# Patient Record
Sex: MALE | Race: WHITE
[De-identification: names, ages, dates, MRNs, and addresses within clinical notes are randomized per-mention and may not be internally consistent; named-entity substitution may affect disease eponyms.]

---

## 2018-06-07 ENCOUNTER — HOSPITAL ENCOUNTER (OUTPATIENT)
Dept: HOSPITAL 76 - LAB.N | Age: 23
End: 2018-06-07
Attending: FAMILY MEDICINE
Payer: MEDICAID

## 2018-06-07 DIAGNOSIS — Z00.00: Primary | ICD-10-CM

## 2018-06-07 DIAGNOSIS — E66.9: ICD-10-CM

## 2018-06-07 LAB
ALBUMIN DIAFP-MCNC: 4 G/DL (ref 3.2–5.5)
ALBUMIN/GLOB SERPL: 1.2 {RATIO} (ref 1–2.2)
ALP SERPL-CCNC: 47 IU/L (ref 42–121)
ALT SERPL W P-5'-P-CCNC: 38 IU/L (ref 10–60)
ANION GAP SERPL CALCULATED.4IONS-SCNC: 7 MMOL/L (ref 6–13)
AST SERPL W P-5'-P-CCNC: 24 IU/L (ref 10–42)
BASOPHILS NFR BLD AUTO: 0.1 10^3/UL (ref 0–0.1)
BASOPHILS NFR BLD AUTO: 0.6 %
BILIRUB BLD-MCNC: 0.5 MG/DL (ref 0.2–1)
BUN SERPL-MCNC: 18 MG/DL (ref 6–20)
CALCIUM UR-MCNC: 9.1 MG/DL (ref 8.5–10.3)
CHLORIDE SERPL-SCNC: 105 MMOL/L (ref 101–111)
CHOLEST SERPL-MCNC: 144 MG/DL
CO2 SERPL-SCNC: 25 MMOL/L (ref 21–32)
CREAT SERPLBLD-SCNC: 0.9 MG/DL (ref 0.6–1.2)
EOSINOPHIL # BLD AUTO: 0.2 10^3/UL (ref 0–0.7)
EOSINOPHIL NFR BLD AUTO: 1.3 %
ERYTHROCYTE [DISTWIDTH] IN BLOOD BY AUTOMATED COUNT: 13.5 % (ref 12–15)
GFRSERPLBLD MDRD-ARVRAT: 104 ML/MIN/{1.73_M2} (ref 89–?)
GLOBULIN SER-MCNC: 3.3 G/DL (ref 2.1–4.2)
GLUCOSE SERPL-MCNC: 94 MG/DL (ref 70–100)
HDLC SERPL-MCNC: 40 MG/DL
HDLC SERPL: 3.6 {RATIO} (ref ?–5)
HGB UR QL STRIP: 14.5 G/DL (ref 14–18)
LDLC SERPL CALC-MCNC: 85 MG/DL
LDLC/HDLC SERPL: 2.1 {RATIO} (ref ?–3.6)
LYMPHOCYTES # SPEC AUTO: 3.6 10^3/UL (ref 1.5–3.5)
LYMPHOCYTES NFR BLD AUTO: 29.2 %
MCH RBC QN AUTO: 28.9 PG (ref 27–31)
MCHC RBC AUTO-ENTMCNC: 33.1 G/DL (ref 32–36)
MCV RBC AUTO: 87.3 FL (ref 80–94)
MONOCYTES # BLD AUTO: 1 10^3/UL (ref 0–1)
MONOCYTES NFR BLD AUTO: 8 %
NEUTROPHILS # BLD AUTO: 7.5 10^3/UL (ref 1.5–6.6)
NEUTROPHILS # SNV AUTO: 12.3 X10^3/UL (ref 4.8–10.8)
NEUTROPHILS NFR BLD AUTO: 60.9 %
PDW BLD AUTO: 8.5 FL (ref 7.4–11.4)
PLATELET # BLD: 329 10^3/UL (ref 130–450)
PROT SPEC-MCNC: 7.3 G/DL (ref 6.7–8.2)
RBC MAR: 5.01 10^6/UL (ref 4.7–6.1)
SODIUM SERPLBLD-SCNC: 137 MMOL/L (ref 135–145)
VLDLC SERPL-SCNC: 19 MG/DL

## 2018-06-07 PROCEDURE — 85025 COMPLETE CBC W/AUTO DIFF WBC: CPT

## 2018-06-07 PROCEDURE — 80053 COMPREHEN METABOLIC PANEL: CPT

## 2018-06-07 PROCEDURE — 80061 LIPID PANEL: CPT

## 2018-06-07 PROCEDURE — 84443 ASSAY THYROID STIM HORMONE: CPT

## 2018-06-07 PROCEDURE — 36415 COLL VENOUS BLD VENIPUNCTURE: CPT

## 2018-06-07 PROCEDURE — 83721 ASSAY OF BLOOD LIPOPROTEIN: CPT

## 2018-09-04 ENCOUNTER — HOSPITAL ENCOUNTER (EMERGENCY)
Dept: HOSPITAL 76 - ED | Age: 23
Discharge: HOME | End: 2018-09-04
Payer: COMMERCIAL

## 2018-09-04 VITALS — SYSTOLIC BLOOD PRESSURE: 151 MMHG | DIASTOLIC BLOOD PRESSURE: 90 MMHG

## 2018-09-04 DIAGNOSIS — R10.11: ICD-10-CM

## 2018-09-04 DIAGNOSIS — M94.0: Primary | ICD-10-CM

## 2018-09-04 LAB
ALBUMIN DIAFP-MCNC: 4.4 G/DL (ref 3.2–5.5)
ALBUMIN/GLOB SERPL: 1.4 {RATIO} (ref 1–2.2)
ALP SERPL-CCNC: 49 IU/L (ref 42–121)
ALT SERPL W P-5'-P-CCNC: 43 IU/L (ref 10–60)
ANION GAP SERPL CALCULATED.4IONS-SCNC: 8 MMOL/L (ref 6–13)
AST SERPL W P-5'-P-CCNC: 23 IU/L (ref 10–42)
BASOPHILS NFR BLD AUTO: 0.1 10^3/UL (ref 0–0.1)
BASOPHILS NFR BLD AUTO: 1 %
BILIRUB BLD-MCNC: 0.6 MG/DL (ref 0.2–1)
BUN SERPL-MCNC: 12 MG/DL (ref 6–20)
CALCIUM UR-MCNC: 9.1 MG/DL (ref 8.5–10.3)
CHLORIDE SERPL-SCNC: 104 MMOL/L (ref 101–111)
CLARITY UR REFRACT.AUTO: CLEAR
CO2 SERPL-SCNC: 27 MMOL/L (ref 21–32)
CREAT SERPLBLD-SCNC: 1 MG/DL (ref 0.6–1.2)
EOSINOPHIL # BLD AUTO: 0.1 10^3/UL (ref 0–0.7)
EOSINOPHIL NFR BLD AUTO: 1.1 %
ERYTHROCYTE [DISTWIDTH] IN BLOOD BY AUTOMATED COUNT: 13.5 % (ref 12–15)
GFRSERPLBLD MDRD-ARVRAT: 91 ML/MIN/{1.73_M2} (ref 89–?)
GLOBULIN SER-MCNC: 3.1 G/DL (ref 2.1–4.2)
GLUCOSE SERPL-MCNC: 101 MG/DL (ref 70–100)
GLUCOSE UR QL STRIP.AUTO: NEGATIVE MG/DL
HGB UR QL STRIP: 15.2 G/DL (ref 14–18)
KETONES UR QL STRIP.AUTO: NEGATIVE MG/DL
LIPASE SERPL-CCNC: 24 U/L (ref 22–51)
LYMPHOCYTES # SPEC AUTO: 3 10^3/UL (ref 1.5–3.5)
LYMPHOCYTES NFR BLD AUTO: 27.4 %
MCH RBC QN AUTO: 29 PG (ref 27–31)
MCHC RBC AUTO-ENTMCNC: 34 G/DL (ref 32–36)
MCV RBC AUTO: 85.2 FL (ref 80–94)
MONOCYTES # BLD AUTO: 0.7 10^3/UL (ref 0–1)
MONOCYTES NFR BLD AUTO: 6.6 %
NEUTROPHILS # BLD AUTO: 7 10^3/UL (ref 1.5–6.6)
NEUTROPHILS # SNV AUTO: 10.9 X10^3/UL (ref 4.8–10.8)
NEUTROPHILS NFR BLD AUTO: 63.9 %
NITRITE UR QL STRIP.AUTO: NEGATIVE
PDW BLD AUTO: 8.1 FL (ref 7.4–11.4)
PH UR STRIP.AUTO: 6.5 PH (ref 5–7.5)
PLATELET # BLD: 342 10^3/UL (ref 130–450)
PROT SPEC-MCNC: 7.5 G/DL (ref 6.7–8.2)
PROT UR STRIP.AUTO-MCNC: NEGATIVE MG/DL
RBC # UR STRIP.AUTO: NEGATIVE /UL
RBC MAR: 5.24 10^6/UL (ref 4.7–6.1)
SODIUM SERPLBLD-SCNC: 139 MMOL/L (ref 135–145)
SP GR UR STRIP.AUTO: 1.02 (ref 1–1.03)
UROBILINOGEN UR QL STRIP.AUTO: (no result) E.U./DL
UROBILINOGEN UR STRIP.AUTO-MCNC: NEGATIVE MG/DL

## 2018-09-04 PROCEDURE — 80053 COMPREHEN METABOLIC PANEL: CPT

## 2018-09-04 PROCEDURE — 99283 EMERGENCY DEPT VISIT LOW MDM: CPT

## 2018-09-04 PROCEDURE — 36415 COLL VENOUS BLD VENIPUNCTURE: CPT

## 2018-09-04 PROCEDURE — 76705 ECHO EXAM OF ABDOMEN: CPT

## 2018-09-04 PROCEDURE — 83690 ASSAY OF LIPASE: CPT

## 2018-09-04 PROCEDURE — 81003 URINALYSIS AUTO W/O SCOPE: CPT

## 2018-09-04 PROCEDURE — 85025 COMPLETE CBC W/AUTO DIFF WBC: CPT

## 2018-09-04 PROCEDURE — 81001 URINALYSIS AUTO W/SCOPE: CPT

## 2018-09-04 PROCEDURE — 87086 URINE CULTURE/COLONY COUNT: CPT

## 2018-09-04 NOTE — ED PHYSICIAN DOCUMENTATION
PD HPI ABD PAIN





- Stated complaint


Stated Complaint: SIDE PX





- Chief complaint


Chief Complaint: Abd Pain





- History obtained from


History obtained from: Patient





- History of Present Illness


Timing - onset: How many days ago (3)


Timing - duration: Days (3)


Timing - details: Gradual onset, Still present, Waxing and waning


Quality: Cramping, Aching, Pain


Location: RUQ, Epigastric


Improved by: No: Eating


Worsened by: Moving, Position (worse lying down and getting up (the movement of 

it)), Palpation.  No: Eating


Associated symptoms: No: Fever, Nausea, Vomiting, Diarrhea, Melena, Chest pain, 

Loss of appetite


Similar symptoms before: Has not had sx before


Recently seen: Not recently seen





Review of Systems


Constitutional: denies: Fever, Chills, Myalgias


Nose: denies: Rhinorrhea / runny nose, Congestion


Throat: denies: Sore throat


Cardiac: denies: Palpitations


Respiratory: denies: Dyspnea, Cough, Wheezing


GI: reports: Abdominal Pain (ruq only).  denies: Nausea, Vomiting, Diarrhea


Skin: denies: Rash, Lesions


Musculoskeletal: denies: Extremity swelling


Neurologic: denies: Generalized weakness, Focal weakness, Numbness, Near syncope





PD PAST MEDICAL HISTORY





- Past Medical History


Cardiovascular: None


Respiratory: None


Neuro: None


Endocrine/Autoimmune: None





- Past Surgical History


Past Surgical History: No





- Present Medications


Home Medications: 


 Ambulatory Orders











 Medication  Instructions  Recorded  Confirmed


 


Benzonatate [Tessalon Perle] 100 - 200 mg PO BID PRN #20 capsule 09/12/15 09/13/

15


 


Azithromycin [Zithromax] 250 mg PO DAILY 09/13/15 09/13/15


 


Promethazine HCl 25 mg PO Q6H PRN #30 tablet 09/13/15 


 


raNITIdine [Zantac] 150 mg PO DAILY #30 tablet 09/13/15 


 


Dexamethasone [Decadron] 4 mg PO DAILY #5 tablet 09/04/18 


 


Naproxen 375 mg PO BID #20 tablet 09/04/18 


 


Ondansetron HCl [Zofran] 4 mg PO Q6H PRN #20 tablet 09/04/18 


 


Tramadol HCl 50 mg PO Q6H PRN #20 tablet 09/04/18 














- Allergies


Allergies/Adverse Reactions: 


 Allergies











Allergy/AdvReac Type Severity Reaction Status Date / Time


 


No Known Drug Allergies Allergy   Verified 09/13/15 22:00














- Social History


Does the pt smoke?: No


Smoking Status: Never smoker


Does the pt drink ETOH?: No


Does the pt have substance abuse?: No





- Family History


Family history: denies: CAD





- Immunizations


Immunizations are current?: Yes





- POLST


Patient has POLST: No





PD ED PE NORMAL





- Vitals


Vital signs reviewed: Yes





- General


General: Alert and oriented X 3, No acute distress, Well developed/nourished





- HEENT


HEENT: Pharynx benign





- Neck


Neck: Supple, no meningeal sign, No adenopathy





- Cardiac


Cardiac: RRR, No murmur





- Respiratory


Respiratory: Clear bilaterally





- Abdomen


Abdomen: Normal bowel sounds, Soft, Non distended, No organomegaly, Other (

tender RUQ without guarding. Tender at lower costal margin without redness nor 

rash. Palpation of cartilage does reproduce his complaint pain. )





- Back


Back: No CVA TTP





- Derm


Derm: Normal color, Warm and dry, No rash





- Extremities


Extremities: No tenderness to palpate, Normal ROM s pain





- Neuro


Neuro: Alert and oriented X 3, No motor deficit, Normal speech





Results





- Vitals


Vitals: 


 Oxygen











O2 Source                      Room air

















- Labs


Labs: 


 Laboratory Tests











  09/04/18 09/04/18 09/04/18





  13:42 13:42 16:09


 


WBC  10.9 H  


 


RBC  5.24  


 


Hgb  15.2  


 


Hct  44.7  


 


MCV  85.2  


 


MCH  29.0  


 


MCHC  34.0  


 


RDW  13.5  


 


Plt Count  342  


 


MPV  8.1  


 


Neut # (Auto)  7.0 H  


 


Lymph # (Auto)  3.0  


 


Mono # (Auto)  0.7  


 


Eos # (Auto)  0.1  


 


Baso # (Auto)  0.1  


 


Absolute Nucleated RBC  0.00  


 


Nucleated RBC %  0.0  


 


Sodium   139 


 


Potassium   3.8 


 


Chloride   104 


 


Carbon Dioxide   27 


 


Anion Gap   8.0 


 


BUN   12 


 


Creatinine   1.0 


 


Estimated GFR (MDRD)   91 


 


Glucose   101 H 


 


Calcium   9.1 


 


Total Bilirubin   0.6 


 


AST   23 


 


ALT   43 


 


Alkaline Phosphatase   49 


 


Total Protein   7.5 


 


Albumin   4.4 


 


Globulin   3.1 


 


Albumin/Globulin Ratio   1.4 


 


Lipase   24 


 


Urine Color    YELLOW


 


Urine Clarity    CLEAR


 


Urine pH    6.5


 


Ur Specific Gravity    1.020


 


Urine Protein    NEGATIVE


 


Urine Glucose (UA)    NEGATIVE


 


Urine Ketones    NEGATIVE


 


Urine Occult Blood    NEGATIVE


 


Urine Nitrite    NEGATIVE


 


Urine Bilirubin    NEGATIVE


 


Urine Urobilinogen    0.2 (NORMAL)


 


Ur Leukocyte Esterase    NEGATIVE


 


Ur Microscopic Review    NOT INDICATED


 


Urine Culture Comments    NOT INDICATED














- Rads (name of study)


  ** RUQ U/S


Radiology: Prelim report reviewed (normal)





PD MEDICAL DECISION MAKING





- ED course


Complexity details: considered differential, d/w patient





- Sepsis Event


Vital Signs: 


 Oxygen











O2 Source                      Room air

















Departure





- Departure


Disposition: 01 Home, Self Care


Clinical Impression: 


 Acute costochondritis





Abdominal pain


Qualifiers:


 Abdominal location: right upper quadrant Qualified Code(s): R10.11 - Right 

upper quadrant pain





Condition: Stable


Record reviewed to determine appropriate education?: Yes


Instructions:  ED Chest Pain Costochondritis


Follow-Up: 


Ebenezer Murillo MD [Primary Care Provider] - 


Prescriptions: 


Dexamethasone [Decadron] 4 mg PO DAILY #5 tablet


Naproxen 375 mg PO BID #20 tablet


Ondansetron HCl [Zofran] 4 mg PO Q6H PRN #20 tablet


 PRN Reason: Nausea / Vomiting


Tramadol HCl 50 mg PO Q6H PRN #20 tablet


 PRN Reason: Pain


Comments: 


Your ultrasound appears normal for the gallbladder and liver.  Your blood tests 

are normal as well.  You are tender at the lower end of the cartilage at the 

end of the rib.  I think it is some inflammation in the area that is causing 

your pain.  Use naproxen twice daily.  Decadron daily for 5 days. Ondansetron 

if needed for nausea.  Add Tylenol or tramadol if needed for pain.  Recheck if 

not improved over the next few days to week.


Discharge Date/Time: 09/04/18 18:39

## 2018-09-04 NOTE — ULTRASOUND REPORT
Reason:  RUQ pain for few days

Procedure Date:  09/04/2018   

Accession Number:  [id number] / [id number]                    

Procedure:  US  - Abdomen Limited CPT Code:  

 

FULL RESULT:

 

 

EXAM:

ABDOMEN ULTRASOUND LIMITED, RUQ

 

EXAM DATE: 9/4/2018 06:05 PM.

 

CLINICAL HISTORY: RUQ pain for few days.

 

COMPARISON: None.

 

TECHNIQUE: Real-time scanning was performed with static images obtained.

 

FINDINGS:

Liver: Diffusely coarse and echogenic with poor sound penetration, 

consistent with fatty penetration, with small areas of focal fatty 

sparing by the gallbladder fossa. Right lobe 18.9 cm. No obvious nodular 

capsule or mass. Main portal vein: Not well seen, question technical.

 

Gallbladder: Unremarkable. No stones, wall thickening, or sonographic 

Dowell's sign.

 

Biliary System: Visualized proximal CBD measures 2.8 mm. Distal CBD 

obscured by bowel gas. No apparent intrahepatic ductal dilatation.

 

Other: No right hydronephrosis or free fluid evident.

IMPRESSION:

1. Consistent with moderate to severe hepatic steatosis, with no focal 

lesion identified on a technically difficult exam.

2. Unremarkable gallbladder. No obvious biliary ductal dilatation.

 

[facility]

## 2020-01-13 ENCOUNTER — HOSPITAL ENCOUNTER (OUTPATIENT)
Dept: HOSPITAL 76 - LAB.R | Age: 25
Discharge: HOME | End: 2020-01-13
Attending: FAMILY MEDICINE
Payer: COMMERCIAL

## 2020-01-13 DIAGNOSIS — E66.9: Primary | ICD-10-CM

## 2020-01-13 LAB
ALBUMIN DIAFP-MCNC: 4 G/DL (ref 3.2–5.5)
ALBUMIN/GLOB SERPL: 1.1 {RATIO} (ref 1–2.2)
ALP SERPL-CCNC: 43 IU/L (ref 42–121)
ALT SERPL W P-5'-P-CCNC: 19 IU/L (ref 10–60)
ANION GAP SERPL CALCULATED.4IONS-SCNC: 11 MMOL/L (ref 6–13)
AST SERPL W P-5'-P-CCNC: 14 IU/L (ref 10–42)
BASOPHILS NFR BLD AUTO: 0.1 10^3/UL (ref 0–0.1)
BASOPHILS NFR BLD AUTO: 0.7 %
BILIRUB BLD-MCNC: 0.3 MG/DL (ref 0.2–1)
BUN SERPL-MCNC: 15 MG/DL (ref 6–20)
CALCIUM UR-MCNC: 8.9 MG/DL (ref 8.5–10.3)
CHLORIDE SERPL-SCNC: 104 MMOL/L (ref 101–111)
CO2 SERPL-SCNC: 26 MMOL/L (ref 21–32)
CREAT SERPLBLD-SCNC: 1.3 MG/DL (ref 0.6–1.2)
EOSINOPHIL # BLD AUTO: 0.4 10^3/UL (ref 0–0.7)
EOSINOPHIL NFR BLD AUTO: 2.1 %
ERYTHROCYTE [DISTWIDTH] IN BLOOD BY AUTOMATED COUNT: 12.6 % (ref 12–15)
GFRSERPLBLD MDRD-ARVRAT: 67 ML/MIN/{1.73_M2} (ref 89–?)
GLOBULIN SER-MCNC: 3.6 G/DL (ref 2.1–4.2)
GLUCOSE SERPL-MCNC: 108 MG/DL (ref 70–100)
HGB UR QL STRIP: 15 G/DL (ref 14–18)
LYMPHOCYTES # SPEC AUTO: 3.5 10^3/UL (ref 1.5–3.5)
LYMPHOCYTES NFR BLD AUTO: 20 %
MCH RBC QN AUTO: 28.6 PG (ref 27–31)
MCHC RBC AUTO-ENTMCNC: 32.8 G/DL (ref 32–36)
MCV RBC AUTO: 87 FL (ref 80–94)
MONOCYTES # BLD AUTO: 1.5 10^3/UL (ref 0–1)
MONOCYTES NFR BLD AUTO: 8.6 %
NEUTROPHILS # BLD AUTO: 11.7 10^3/UL (ref 1.5–6.6)
NEUTROPHILS # SNV AUTO: 17.3 X10^3/UL (ref 4.8–10.8)
NEUTROPHILS NFR BLD AUTO: 67.4 %
PDW BLD AUTO: 10.2 FL (ref 7.4–11.4)
PLATELET # BLD: 438 10^3/UL (ref 130–450)
PROT SPEC-MCNC: 7.6 G/DL (ref 6.7–8.2)
RBC MAR: 5.25 10^6/UL (ref 4.7–6.1)
SODIUM SERPLBLD-SCNC: 141 MMOL/L (ref 135–145)

## 2020-01-13 PROCEDURE — 85025 COMPLETE CBC W/AUTO DIFF WBC: CPT

## 2020-01-13 PROCEDURE — 80053 COMPREHEN METABOLIC PANEL: CPT

## 2021-12-27 ENCOUNTER — HOSPITAL ENCOUNTER (OUTPATIENT)
Dept: HOSPITAL 76 - LAB.N | Age: 26
Discharge: HOME | End: 2021-12-27
Attending: FAMILY MEDICINE
Payer: COMMERCIAL

## 2021-12-27 DIAGNOSIS — R42: ICD-10-CM

## 2021-12-27 DIAGNOSIS — I10: Primary | ICD-10-CM

## 2021-12-27 LAB
ALBUMIN DIAFP-MCNC: 4.4 G/DL (ref 3.2–5.5)
ALBUMIN/GLOB SERPL: 1.4 {RATIO} (ref 1–2.2)
ALP SERPL-CCNC: 45 IU/L (ref 42–121)
ALT SERPL W P-5'-P-CCNC: 40 IU/L (ref 10–60)
ANION GAP SERPL CALCULATED.4IONS-SCNC: 9 MMOL/L (ref 6–13)
AST SERPL W P-5'-P-CCNC: 23 IU/L (ref 10–42)
BASOPHILS NFR BLD AUTO: 0.1 10^3/UL (ref 0–0.1)
BASOPHILS NFR BLD AUTO: 0.7 %
BILIRUB BLD-MCNC: 0.4 MG/DL (ref 0.2–1)
BUN SERPL-MCNC: 18 MG/DL (ref 6–20)
CALCIUM UR-MCNC: 9 MG/DL (ref 8.5–10.3)
CHLORIDE SERPL-SCNC: 103 MMOL/L (ref 101–111)
CO2 SERPL-SCNC: 26 MMOL/L (ref 21–32)
CREAT SERPLBLD-SCNC: 0.8 MG/DL (ref 0.6–1.2)
EOSINOPHIL # BLD AUTO: 0.1 10^3/UL (ref 0–0.7)
EOSINOPHIL NFR BLD AUTO: 1.2 %
ERYTHROCYTE [DISTWIDTH] IN BLOOD BY AUTOMATED COUNT: 12.9 % (ref 12–15)
GFRSERPLBLD MDRD-ARVRAT: 115 ML/MIN/{1.73_M2} (ref 89–?)
GLOBULIN SER-MCNC: 3.1 G/DL (ref 2.1–4.2)
GLUCOSE SERPL-MCNC: 98 MG/DL (ref 70–100)
HCT VFR BLD AUTO: 44.1 % (ref 42–52)
HGB UR QL STRIP: 14.8 G/DL (ref 14–18)
LYMPHOCYTES # SPEC AUTO: 2.9 10^3/UL (ref 1.5–3.5)
LYMPHOCYTES NFR BLD AUTO: 25.5 %
MCH RBC QN AUTO: 28.9 PG (ref 27–31)
MCHC RBC AUTO-ENTMCNC: 33.6 G/DL (ref 32–36)
MCV RBC AUTO: 86.1 FL (ref 80–94)
MONOCYTES # BLD AUTO: 0.9 10^3/UL (ref 0–1)
MONOCYTES NFR BLD AUTO: 7.7 %
NEUTROPHILS # BLD AUTO: 7.2 10^3/UL (ref 1.5–6.6)
NEUTROPHILS # SNV AUTO: 11.2 X10^3/UL (ref 4.8–10.8)
NEUTROPHILS NFR BLD AUTO: 64.3 %
NRBC # BLD AUTO: 0 /100WBC
NRBC # BLD AUTO: 0 X10^3/UL
PDW BLD AUTO: 10.2 FL (ref 7.4–11.4)
PLATELET # BLD: 357 10^3/UL (ref 130–450)
POTASSIUM SERPL-SCNC: 3.8 MMOL/L (ref 3.5–5)
PROT SPEC-MCNC: 7.5 G/DL (ref 6.7–8.2)
RBC MAR: 5.12 10^6/UL (ref 4.7–6.1)
SODIUM SERPLBLD-SCNC: 138 MMOL/L (ref 135–145)
TSH SERPL-ACNC: 2.22 UIU/ML (ref 0.34–5.6)

## 2021-12-27 PROCEDURE — 81001 URINALYSIS AUTO W/SCOPE: CPT

## 2021-12-27 PROCEDURE — 85025 COMPLETE CBC W/AUTO DIFF WBC: CPT

## 2021-12-27 PROCEDURE — 36415 COLL VENOUS BLD VENIPUNCTURE: CPT

## 2021-12-27 PROCEDURE — 80053 COMPREHEN METABOLIC PANEL: CPT

## 2021-12-27 PROCEDURE — 84443 ASSAY THYROID STIM HORMONE: CPT

## 2021-12-27 PROCEDURE — 87086 URINE CULTURE/COLONY COUNT: CPT

## 2022-01-03 ENCOUNTER — HOSPITAL ENCOUNTER (OUTPATIENT)
Dept: HOSPITAL 76 - LAB | Age: 27
End: 2022-01-03
Attending: FAMILY MEDICINE
Payer: COMMERCIAL

## 2022-01-03 DIAGNOSIS — R42: ICD-10-CM

## 2022-01-03 DIAGNOSIS — I10: Primary | ICD-10-CM

## 2022-01-03 LAB
CLARITY UR REFRACT.AUTO: (no result)
CRYSTALS URNS QL MICRO: (no result) /LPF
GLUCOSE UR QL STRIP.AUTO: NEGATIVE MG/DL
KETONES UR QL STRIP.AUTO: NEGATIVE MG/DL
NITRITE UR QL STRIP.AUTO: NEGATIVE
PH UR STRIP.AUTO: 6 PH (ref 5–7.5)
PROT UR STRIP.AUTO-MCNC: NEGATIVE MG/DL
RBC # UR STRIP.AUTO: NEGATIVE /UL
SP GR UR STRIP.AUTO: >=1.03 (ref 1–1.03)
SQUAMOUS URNS QL MICRO: (no result)
UROBILINOGEN UR QL STRIP.AUTO: (no result) E.U./DL
UROBILINOGEN UR STRIP.AUTO-MCNC: NEGATIVE MG/DL
WBC # UR MANUAL: (no result) /HPF (ref 0–3)

## 2022-01-03 PROCEDURE — 87086 URINE CULTURE/COLONY COUNT: CPT

## 2022-01-03 PROCEDURE — 81001 URINALYSIS AUTO W/SCOPE: CPT

## 2023-01-28 ENCOUNTER — HOSPITAL ENCOUNTER (EMERGENCY)
Dept: HOSPITAL 76 - ED | Age: 28
Discharge: HOME | End: 2023-01-28
Payer: COMMERCIAL

## 2023-01-28 VITALS — DIASTOLIC BLOOD PRESSURE: 60 MMHG | SYSTOLIC BLOOD PRESSURE: 113 MMHG

## 2023-01-28 DIAGNOSIS — R55: ICD-10-CM

## 2023-01-28 DIAGNOSIS — K92.1: Primary | ICD-10-CM

## 2023-01-28 DIAGNOSIS — E87.6: ICD-10-CM

## 2023-01-28 LAB
ALBUMIN DIAFP-MCNC: 4.4 G/DL (ref 3.2–5.5)
ALBUMIN/GLOB SERPL: 1.4 {RATIO} (ref 1–2.2)
ALP SERPL-CCNC: 52 IU/L (ref 42–121)
ALT SERPL W P-5'-P-CCNC: 68 IU/L (ref 10–60)
ANION GAP SERPL CALCULATED.4IONS-SCNC: 8 MMOL/L (ref 6–13)
AST SERPL W P-5'-P-CCNC: 37 IU/L (ref 10–42)
BASOPHILS # BLD MANUAL: 0 10^3/UL (ref 0–0.1)
BASOPHILS NFR BLD AUTO: 0.7 %
BILIRUB BLD-MCNC: 0.6 MG/DL (ref 0.2–1)
BUN SERPL-MCNC: 17 MG/DL (ref 6–20)
CALCIUM UR-MCNC: 8.9 MG/DL (ref 8.5–10.3)
CHLORIDE SERPL-SCNC: 100 MMOL/L (ref 101–111)
CO2 SERPL-SCNC: 29 MMOL/L (ref 21–32)
CREAT SERPLBLD-SCNC: 1.2 MG/DL (ref 0.6–1.2)
EOSINOPHIL # BLD MANUAL: 0.7 10^3/UL (ref 0–0.7)
EOSINOPHIL NFR BLD AUTO: 2.1 %
ERYTHROCYTE [DISTWIDTH] IN BLOOD BY AUTOMATED COUNT: 13.2 % (ref 12–15)
GFRSERPLBLD MDRD-ARVRAT: 72 ML/MIN/{1.73_M2} (ref 89–?)
GLOBULIN SER-MCNC: 3.1 G/DL (ref 2.1–4.2)
GLUCOSE SERPL-MCNC: 131 MG/DL (ref 70–100)
HCT VFR BLD AUTO: 44.7 % (ref 42–52)
HGB UR QL STRIP: 14.7 G/DL (ref 14–18)
LIPASE SERPL-CCNC: 29 U/L (ref 22–51)
LYMPH ABN NFR BLD MANUAL: 0 %
LYMPHOBLASTS # BLD: 19 %
LYMPHOCYTES # BLD MANUAL: 8.2 10^3/UL (ref 1.5–3.5)
LYMPHOCYTES NFR BLD AUTO: 41.9 %
MANUAL DIF COMMENT BLD-IMP: (no result)
MCH RBC QN AUTO: 28.1 PG (ref 27–31)
MCHC RBC AUTO-ENTMCNC: 32.9 G/DL (ref 32–36)
MCV RBC AUTO: 85.3 FL (ref 80–94)
MONOCYTES # BLD MANUAL: 0.9 10^3/UL (ref 0–1)
MONOCYTES NFR BLD AUTO: 7.3 %
NEUTROPHILS # SNV AUTO: 17 X10^3/UL (ref 4.8–10.8)
NEUTROPHILS NFR BLD AUTO: 47.6 %
NEUTROPHILS NFR BLD MANUAL: 7.3 10^3/UL (ref 1.5–6.6)
NEUTS BAND NFR BLD: 0 %
PDW BLD AUTO: 10.3 FL (ref 7.4–11.4)
PLAT MORPH BLD: (no result)
PLATELET # BLD: 378 10^3/UL (ref 130–450)
PLATELET BLD QL SMEAR: (no result)
POTASSIUM SERPL-SCNC: 3.4 MMOL/L (ref 3.5–5)
PROT SPEC-MCNC: 7.5 G/DL (ref 6.7–8.2)
RBC MAR: 5.24 10^6/UL (ref 4.7–6.1)
RBC MORPH BLD: (no result)
SODIUM SERPLBLD-SCNC: 137 MMOL/L (ref 135–145)
VARIANT LYMPHS NFR BLD MANUAL: 29 %

## 2023-01-28 PROCEDURE — 85025 COMPLETE CBC W/AUTO DIFF WBC: CPT

## 2023-01-28 PROCEDURE — 83690 ASSAY OF LIPASE: CPT

## 2023-01-28 PROCEDURE — 36415 COLL VENOUS BLD VENIPUNCTURE: CPT

## 2023-01-28 PROCEDURE — 71045 X-RAY EXAM CHEST 1 VIEW: CPT

## 2023-01-28 PROCEDURE — 82272 OCCULT BLD FECES 1-3 TESTS: CPT

## 2023-01-28 PROCEDURE — 80053 COMPREHEN METABOLIC PANEL: CPT

## 2023-01-28 PROCEDURE — 96361 HYDRATE IV INFUSION ADD-ON: CPT

## 2023-01-28 PROCEDURE — 96374 THER/PROPH/DIAG INJ IV PUSH: CPT

## 2023-01-28 PROCEDURE — 99284 EMERGENCY DEPT VISIT MOD MDM: CPT

## 2023-01-28 PROCEDURE — 93005 ELECTROCARDIOGRAM TRACING: CPT

## 2023-01-28 NOTE — ED PHYSICIAN DOCUMENTATION
History of Present Illness





- Stated complaint


Stated Complaint: ABDOMINAL PX/BLOOD IN STOOL





- Chief complaint


Chief Complaint: General





- History obtained from


History obtained from: Patient, Family (Mother)





- Additonal information


Additional information: 


Patient is a 27-year-old male with no significant past medical history 

presenting for evaluation of nausea, vomiting and diarrhea for the past 2 days. 

Yesterday he developed nausea and had several episodes of emesis consisting of 

the food he had eaten.  Today he has had 2 episodes of diarrhea including 1 this

evening that had a large amount of bright red blood.  He denies abdominal 

discomfort or cramping.Per his mother who is at the bedside he has a history of 

syncope when he has his blood drawn or sees his own blood.  He did have a 

syncopal event at home after seeing the blood in the toilet.  He recalls feeling

dizzy and lightheaded prior to this.  He did not hit his head.He does not take a

blood thinner.  No prior episodes similar to this of blood in his stools.Denies 

alcohol use, NSAID use.Patient denies cannabis or illicit substance use.He 

denies chest pain or difficulty breathing.





Patient states that he did not have much to eat today until dinnertime that he 

had with his mother.  Mother reports they had steak, mashed potatoes, beets, 

jalapeno poppers for dinner.  Patient reports only eating 1 beat for dinner.








Review of Systems


Constitutional: denies: Fever


Cardiac: denies: Palpitations


Respiratory: denies: Dyspnea


GI: reports: Vomiting, Diarrhea, Bloody / black stool.  denies: Abdominal Pain


: denies: Dysuria


Musculoskeletal: denies: Back pain


Neurologic: denies: Headache





PD PAST MEDICAL HISTORY





- Past Medical History


Cardiovascular: None


Respiratory: None


Neuro: None


Endocrine/Autoimmune: None





- Past Surgical History


Past Surgical History: No





- Present Medications


Home Medications: 


                                Ambulatory Orders











 Medication  Instructions  Recorded  Confirmed


 


Ondansetron Odt [Zofran] 4 mg TL Q6H PRN #10 tablet 01/28/23 














- Allergies


Allergies/Adverse Reactions: 


                                    Allergies











Allergy/AdvReac Type Severity Reaction Status Date / Time


 


No Known Drug Allergies Allergy   Verified 09/13/15 22:00














- Social History


Does the pt smoke?: No


Smoking Status: Never smoker


Does the pt drink ETOH?: No


Does the pt have substance abuse?: No





- Immunizations


Immunizations are current?: Yes





- POLST


Patient has POLST: No





PD ED PE NORMAL





- General


General: Alert and oriented X 3, No acute distress, Well developed/nourished





- HEENT


HEENT: Atraumatic, Moist mucous membranes, Pharynx benign





- Neck


Neck: Supple, no meningeal sign





- Cardiac


Cardiac: RRR





- Respiratory


Respiratory: No respiratory distress, Clear bilaterally





- Abdomen


Abdomen: Normal bowel sounds, Soft, Non tender, Non distended





- Rectal


Rectal: Other (Chaperoned by Nigel, normal rectal tone, no external hemorrhoids or

 fissures, blood noted on glove, no internal masses or pain on exam)





- Derm


Derm: Warm and dry





- Extremities


Extremities: No edema





- Neuro


Neuro: Alert and oriented X 3, CNs 2-12 intact, No motor deficit, No sensory 

deficit, Normal speech





Results





- Vitals


Vitals: 


                               Vital Signs - 24 hr











  01/28/23 01/28/23 01/28/23





  21:06 21:11 22:16


 


Temperature 36.9 C  


 


Heart Rate 66 58 L 


 


Heart Rate [   56 L





Sitting]   


 


Heart Rate [   62





Standing]   


 


Heart Rate [   58 L





Supine]   


 


Respiratory 16 18 





Rate   


 


Blood Pressure 105/64 124/75 


 


Blood Pressure   115/70





[Sitting]   


 


Blood Pressure   119/74





[Standing]   


 


Blood Pressure   113/60





[Supine]   


 


O2 Saturation 97 98 








                                     Oxygen











O2 Source                      Room air

















- EKG (time done)


  ** 2122


Rate: Rate (enter#) (43)


Rhythm: Sinus bradycardia


Axis: Normal


Ischemia: T wave inversion (lead 3).  No: ST elevation c/w ischemia


Compare to prior EKG: Old EKG unavailable





- Labs


Labs: 


                                  Microbiology











 01/28/23 22:00 Occult Blood - Final





 Stool 








                                Laboratory Tests











  01/28/23 01/28/23





  21:19 21:19


 


WBC  17.0 H 


 


RBC  5.24 


 


Hgb  14.7 


 


Hct  44.7 


 


MCV  85.3 


 


MCH  28.1 


 


MCHC  32.9 


 


RDW  13.2 


 


Plt Count  378 


 


MPV  10.3 


 


Neut # (Auto)  Not Reportable 


 


Lymph # (Auto)  Not Reportable 


 


Mono # (Auto)  Not Reportable 


 


Eos # (Auto)  Not Reportable 


 


Baso # (Auto)  Not Reportable 


 


Absolute Nucleated RBC  Not Reportable 


 


Total Counted  100 


 


Band Neuts % (Manual)  0 


 


Reactive Lymphs % (Man)  29 


 


Abnorm Lymph % (Manual)  0 


 


Nucleated RBC %  Not Reportable 


 


Neutrophils # (Manual)  7.3 H 


 


Lymphocytes # (Manual)  8.2 H 


 


Monocytes # (Manual)  0.9 


 


Eosinophils # (Manual)  0.7 


 


Basophils # (Manual)  0.0 


 


Differential Comment  MANUAL DIFFERENTIAL 


 


Platelet Estimate  NORMAL (130-450,000) 


 


Platelet Morphology  NORMAL APPEARANCE 


 


RBC Morph Micro Appear  NORMAL APPEARANCE 


 


Sodium   137


 


Potassium   3.4 L


 


Chloride   100 L


 


Carbon Dioxide   29


 


Anion Gap   8.0


 


BUN   17


 


Creatinine   1.2


 


Estimated GFR (MDRD)   72 L


 


Glucose   131 H


 


Calcium   8.9


 


Total Bilirubin   0.6


 


AST   37


 


ALT   68 H


 


Alkaline Phosphatase   52


 


Total Protein   7.5


 


Albumin   4.4


 


Globulin   3.1


 


Albumin/Globulin Ratio   1.4


 


Lipase   29














PD Medical Decision Making





- ED course


Complexity details: reviewed results, re-evaluated patient, d/w patient, d/w 

family


ED course: 


Patient presenting for evaluation of blood in his stools.His abdominal exam is 

benign.  His vital signs appear stable with some episodes of bradycardia.  He 

appears asymptomatic during these times.  Rectal exam was performed with blood 

noted however guaiac came back negative.  Patient did have a beat for dinner 

tonight but denies eating a large amount.His CBC and chemistries were reviewed. 

 He does have a leukocytosis of 17,000.  This could be reactive given recent 

vomiting.  He is afebrile and again has a benign exam.Chemistries are 

essentially normal with potassium of 3.4.  P.o. potassium was also given.  

Patient feeling better after IV fluids.  He did have a syncopal episode at home 

when he saw blood in the toilet.  He does have a history of similar responses.  

His neuro exam is normal.  There is no head injury.  He is not orthostatic.At 

this time I do not feel he needs an emergent scope. Recommend close follow-up 

with PCP for outpatient referral.  Mother is at the bedside.  History was also 

obtained from her.  Patient and mother counseled on concerning symptoms to 

return for.





Departure





- Departure


Disposition: 01 Home, Self Care


Clinical Impression: 


 Hematochezia, Syncope





Condition: Stable


Instructions:  ED Hematochezia Stable, ED Syncope Vasovagal


Prescriptions: 


Ondansetron Odt [Zofran] 4 mg TL Q6H PRN #10 tablet


 PRN Reason: Nausea / Vomiting


Comments: 


You were evaluated after noticing blood in your stools.  Your hemoglobin appears

 stable.  He did have a fainting spell which could have been related to seeing 

the blood in your stools.I would recommend close follow-up with your primary 

care doctor as you should have a referral for a colonoscopy.  If you have any 

worsening symptoms please consider return to the emergency department.  It is 

important for you to stay hydrated tomorrow and I would start with a bland 

diet.I have sent a prescription for antinausea medications to [facility] in [place].


Discharge Date/Time: 01/28/23 22:42

## 2023-01-28 NOTE — XRAY REPORT
PROCEDURE:  Chest 1 View X-Ray

 

INDICATIONS:  syncope

 

TECHNIQUE:  One view of the chest was acquired.  

 

COMPARISON:  Chest 2 views 9/13/2015.

 

FINDINGS:  

 

Surgical changes and devices:  None.  

 

Lungs and pleura:  No pleural effusions or pneumothorax.  Lungs are clear.  

 

Mediastinum:  Mediastinal contours appear normal.  Heart size is normal.  

 

Bones and chest wall:  No suspicious bony lesions.  Overlying soft tissues appear unremarkable.  

 

 

IMPRESSION:  

 

Normal for age, source of syncope is not found, no trauma from syncope is suspected.

 

Reviewed by: Fam Greenberg MD on 1/28/2023 9:44 PM PST

Approved by: Fam Greenberg MD on 1/28/2023 9:44 PM PST

 

 

Station ID:  [id number]